# Patient Record
Sex: FEMALE | Race: BLACK OR AFRICAN AMERICAN | Employment: STUDENT | URBAN - METROPOLITAN AREA
[De-identification: names, ages, dates, MRNs, and addresses within clinical notes are randomized per-mention and may not be internally consistent; named-entity substitution may affect disease eponyms.]

---

## 2022-08-10 ENCOUNTER — HOSPITAL ENCOUNTER (OUTPATIENT)
Age: 22
Discharge: HOME | End: 2022-08-10
Payer: MEDICAID

## 2022-08-10 DIAGNOSIS — Z20.822 ENCOUNTER FOR LABORATORY TESTING FOR COVID-19 VIRUS: ICD-10-CM

## 2022-08-10 DIAGNOSIS — R30.0 DYSURIA: ICD-10-CM

## 2022-08-10 DIAGNOSIS — Z11.3 SCREENING FOR STD (SEXUALLY TRANSMITTED DISEASE): ICD-10-CM

## 2022-08-10 DIAGNOSIS — N39.0 ACUTE UTI: Primary | ICD-10-CM

## 2022-08-10 LAB
B-HCG UR QL: NEGATIVE
BILIRUB UR QL STRIP: NEGATIVE
CLARITY UR: CLEAR
COLOR UR: YELLOW
GLUCOSE UR STRIP-MCNC: NEGATIVE MG/DL
KETONES UR STRIP-MCNC: NEGATIVE MG/DL
NITRITE UR QL STRIP: POSITIVE
PH UR STRIP: 5.5 [PH]
PROT UR STRIP-MCNC: 100 MG/DL
SARS-COV-2 RNA RESP QL NAA+PROBE: NOT DETECTED
SP GR UR STRIP: >=1.03
UROBILINOGEN UR STRIP-ACNC: <2 MG/DL

## 2022-08-10 PROCEDURE — 81025 URINE PREGNANCY TEST: CPT | Performed by: NURSE PRACTITIONER

## 2022-08-10 PROCEDURE — U0002 COVID-19 LAB TEST NON-CDC: HCPCS | Performed by: NURSE PRACTITIONER

## 2022-08-10 PROCEDURE — 99204 OFFICE O/P NEW MOD 45 MIN: CPT | Performed by: NURSE PRACTITIONER

## 2022-08-10 PROCEDURE — 81002 URINALYSIS NONAUTO W/O SCOPE: CPT | Performed by: NURSE PRACTITIONER

## 2022-08-10 RX ORDER — NITROFURANTOIN 25; 75 MG/1; MG/1
100 CAPSULE ORAL 2 TIMES DAILY
Qty: 10 CAPSULE | Refills: 0 | Status: SHIPPED | OUTPATIENT
Start: 2022-08-10 | End: 2022-08-15

## 2022-08-10 NOTE — ED INITIAL ASSESSMENT (HPI)
Pt here with complaints of chills , headache and vaginal irritation that began 2 days ago , pt denies any vaginal discharge , abd pain or fever

## 2022-08-10 NOTE — ED PROVIDER NOTES
Patient Seen in: [facility]      History   Patient presents with: Body ache and/or chills  Eval-G      Subjective:   HPI  Patient is a 64-year-old female with unremarkable past medical history. She presents to the immediate care center today with primary concern for dysuria. This began 2 days ago after having sexual intercourse. She states since that same time she has had a foul vaginal odor of which she is unfamiliar. She denies urinary frequency or hematuria; denies abdominal or back pain. She does note that she had a low-grade fever and some myalgia several days preceding these other symptoms that has resolved. She states, \"I think I had a cold but it is gone. \"        Objective:   History reviewed. No pertinent past medical history. No pertinent past surgical history. No pertinent social history. Review of Systems   Constitutional: Negative. Gastrointestinal: Negative for abdominal pain, diarrhea, nausea and vomiting. Genitourinary: Positive for dysuria. Negative for flank pain, frequency and hematuria. Skin: Negative for rash. Neurological: Negative for weakness. Positive for stated complaint: chills,abdominal pain  Other systems are as noted in HPI. Constitutional and vital signs reviewed. All other systems reviewed and negative except as noted above. Physical Exam     ED Triage Vitals   BP    Pulse    Resp    Temp    Temp src    SpO2    O2 Device        Current:LMP 07/16/2022         Physical Exam  Vitals and nursing note reviewed. Constitutional:       General: She is not in acute distress. Appearance: She is not ill-appearing or toxic-appearing. Pulmonary:      Effort: Pulmonary effort is normal. No respiratory distress. Abdominal:      Tenderness: There is no abdominal tenderness. Skin:     General: Skin is warm and dry. Findings: No rash.    Neurological:      Mental Status: She is alert and oriented to person, place, and time. Psychiatric:         Behavior: Behavior normal.               ED Course     Labs Reviewed   [facility] POCT URINALYSIS DIPSTICK - Abnormal; Notable for the following components:       Result Value    Protein urine 100  (*)     Blood, Urine Small (*)     Nitrite Urine Positive (*)     Leukocyte esterase urine Small (*)     All other components within normal limits   POCT PREGNANCY URINE - Normal   RAPID SARS-COV-2 BY PCR - Normal   URINE CULTURE, ROUTINE   CHLAMYDIA/GONOCOCCUS, GREG   CHLAMYDIA/GONOCOCCUS, GREG                   MDM        Patient was advised given her urinary symptoms and the presence of leukocytes and nitrites in her urine, we will treat her today for urinary tract infection. She requested screening for sexually transmitted infection. Patient has never had a pelvic examination. The process for completing this examination was explained to patient. She was also informed that by doing so we will be able to test her today for bacterial vaginosis which is possible given her described foul odor present after intercourse. She requested screening for sexually transmitted infection by urinalysis. She was informed that this testing methodology is less sensitive and that not all necessary pelvic cultures completed by urine speciman. She states understanding, asks that we at least initiate gonorrhea and Chlamydia testing (understanding it is less sensitive) by urinalysis today, and that she will follow with gynecology for complete pelvic examination.                              Disposition and Plan     Clinical Impression:  Acute UTI  (primary encounter diagnosis)  Encounter for laboratory testing for COVID-19 virus  Dysuria  Screening for STD (sexually transmitted disease)     Disposition:  Discharge  8/10/2022  9:59 am    Follow-up:  Torres Goode MD  [address] [place] [address]  [phone]    Schedule an appointment as soon as possible for a visit in 1 week            Medications Prescribed:  Discharge Medication List as of 8/10/2022 10:00 AM    START taking these medications    nitrofurantoin monohydrate macro 100 MG Oral Cap  Take 1 capsule (100 mg total) by mouth 2 (two) times daily for 5 days. , Normal, Disp-10 capsule, R-0

## 2022-08-11 LAB
C TRACH DNA SPEC QL NAA+PROBE: NEGATIVE
N GONORRHOEA DNA SPEC QL NAA+PROBE: NEGATIVE

## 2022-08-12 RX ORDER — CEPHALEXIN 500 MG/1
500 CAPSULE ORAL 2 TIMES DAILY
Qty: 10 CAPSULE | Refills: 0 | Status: SHIPPED | OUTPATIENT
Start: 2022-08-12 | End: 2022-08-17

## 2022-08-12 RX ORDER — PHENAZOPYRIDINE HYDROCHLORIDE 200 MG/1
200 TABLET, FILM COATED ORAL 3 TIMES DAILY PRN
Qty: 6 TABLET | Refills: 0 | Status: SHIPPED | OUTPATIENT
Start: 2022-08-12 | End: 2022-08-14

## 2022-08-12 NOTE — ED QUICK NOTES
Patient called enquiring about Keflex order. RN did not have an opportunity to call patient with result and new treatment. Discussed findings with patient and use of new medication  Patients states continues to have some pain with urination. RX for pyridium sent to pharmacy.

## 2022-08-21 ENCOUNTER — HOSPITAL ENCOUNTER (OUTPATIENT)
Age: 22
Discharge: HOME | End: 2022-08-21
Payer: MEDICAID

## 2022-08-21 VITALS
RESPIRATION RATE: 19 BRPM | SYSTOLIC BLOOD PRESSURE: 122 MMHG | DIASTOLIC BLOOD PRESSURE: 70 MMHG | HEART RATE: 106 BPM | TEMPERATURE: 98 F | OXYGEN SATURATION: 100 %

## 2022-08-21 DIAGNOSIS — B00.9 HSV-2 (HERPES SIMPLEX VIRUS 2) INFECTION: Primary | ICD-10-CM

## 2022-08-21 DIAGNOSIS — N89.8 VAGINAL LESION: ICD-10-CM

## 2022-08-21 LAB
B-HCG UR QL: NEGATIVE
BILIRUB UR QL STRIP: NEGATIVE
GLUCOSE UR STRIP-MCNC: NEGATIVE MG/DL
KETONES UR STRIP-MCNC: NEGATIVE MG/DL
NITRITE UR QL STRIP: NEGATIVE
PH UR STRIP: 7.5 [PH]
PROT UR STRIP-MCNC: 100 MG/DL
SP GR UR STRIP: 1.02
UROBILINOGEN UR STRIP-ACNC: <2 MG/DL

## 2022-08-21 PROCEDURE — 81002 URINALYSIS NONAUTO W/O SCOPE: CPT | Performed by: NURSE PRACTITIONER

## 2022-08-21 PROCEDURE — 81025 URINE PREGNANCY TEST: CPT | Performed by: NURSE PRACTITIONER

## 2022-08-21 PROCEDURE — 99213 OFFICE O/P EST LOW 20 MIN: CPT | Performed by: NURSE PRACTITIONER

## 2022-08-21 RX ORDER — VALACYCLOVIR HYDROCHLORIDE 1 G/1
1000 TABLET, FILM COATED ORAL EVERY 12 HOURS
Qty: 20 TABLET | Refills: 0 | Status: SHIPPED | OUTPATIENT
Start: 2022-08-21 | End: 2022-08-31

## 2022-08-21 NOTE — ED INITIAL ASSESSMENT (HPI)
Pt presents with burning and irritation to vaginal area. \"Bumps to vaginal area\", per pt. Pt reports initially having vaginal discharge, minimal at this time. Pt was prescribed Keflex on 8/12/22, completed on 8/17/22.

## 2022-08-23 LAB
HSV1 DNA SPEC QL NAA+PROBE: NEGATIVE
HSV2 DNA SPEC QL NAA+PROBE: POSITIVE

## 2022-10-25 ENCOUNTER — HOSPITAL ENCOUNTER (OUTPATIENT)
Age: 22
Discharge: HOME | End: 2022-10-25
Payer: MEDICAID

## 2022-10-25 VITALS
DIASTOLIC BLOOD PRESSURE: 82 MMHG | OXYGEN SATURATION: 100 % | SYSTOLIC BLOOD PRESSURE: 124 MMHG | TEMPERATURE: 98 F | HEART RATE: 90 BPM | RESPIRATION RATE: 16 BRPM

## 2022-10-25 DIAGNOSIS — R82.90 FOUL SMELLING URINE: Primary | ICD-10-CM

## 2022-10-25 LAB
BILIRUB UR QL STRIP: NEGATIVE
CLARITY UR: CLEAR
COLOR UR: YELLOW
GLUCOSE UR STRIP-MCNC: NEGATIVE MG/DL
KETONES UR STRIP-MCNC: NEGATIVE MG/DL
LEUKOCYTE ESTERASE UR QL STRIP: NEGATIVE
NITRITE UR QL STRIP: NEGATIVE
PH UR STRIP: 6 [PH]
PROT UR STRIP-MCNC: NEGATIVE MG/DL
SP GR UR STRIP: 1.01
UROBILINOGEN UR STRIP-ACNC: <2 MG/DL

## 2022-10-25 PROCEDURE — 81002 URINALYSIS NONAUTO W/O SCOPE: CPT | Performed by: NURSE PRACTITIONER

## 2022-10-25 PROCEDURE — 99212 OFFICE O/P EST SF 10 MIN: CPT | Performed by: NURSE PRACTITIONER

## 2022-10-25 NOTE — ED INITIAL ASSESSMENT (HPI)
Pt here with complaints of a fishy vaginal odor for the past 3 days, pt states she has some white vaginal discharge, pt denies any sexual intercourse or abd pain

## 2023-04-09 ENCOUNTER — HOSPITAL ENCOUNTER (OUTPATIENT)
Age: 23
Discharge: ANOTHER HOSPITAL | End: 2023-04-09
Payer: MEDICAID

## 2023-04-09 VITALS
RESPIRATION RATE: 16 BRPM | OXYGEN SATURATION: 100 % | TEMPERATURE: 99 F | DIASTOLIC BLOOD PRESSURE: 71 MMHG | SYSTOLIC BLOOD PRESSURE: 90 MMHG | HEART RATE: 65 BPM

## 2023-04-09 DIAGNOSIS — Z98.890 S/P D&C (STATUS POST DILATION AND CURETTAGE): ICD-10-CM

## 2023-04-09 DIAGNOSIS — R82.90 FOUL SMELLING URINE: Primary | ICD-10-CM

## 2023-04-09 LAB
B-HCG UR QL: POSITIVE
BILIRUB UR QL STRIP: NEGATIVE
CLARITY UR: CLEAR
COLOR UR: YELLOW
GLUCOSE UR STRIP-MCNC: NEGATIVE MG/DL
KETONES UR STRIP-MCNC: NEGATIVE MG/DL
LEUKOCYTE ESTERASE UR QL STRIP: NEGATIVE
NITRITE UR QL STRIP: NEGATIVE
PH UR STRIP: 6.5 [PH]
PROT UR STRIP-MCNC: 30 MG/DL
SP GR UR STRIP: >=1.03
UROBILINOGEN UR STRIP-ACNC: <2 MG/DL

## 2024-11-11 ENCOUNTER — HOSPITAL ENCOUNTER (OUTPATIENT)
Age: 24
Discharge: HOME | End: 2024-11-11
Payer: MEDICAID

## 2024-11-11 VITALS
OXYGEN SATURATION: 100 % | TEMPERATURE: 99 F | HEART RATE: 75 BPM | RESPIRATION RATE: 20 BRPM | SYSTOLIC BLOOD PRESSURE: 91 MMHG | DIASTOLIC BLOOD PRESSURE: 73 MMHG

## 2024-11-11 DIAGNOSIS — N76.0 ACUTE VAGINITIS: Primary | ICD-10-CM

## 2024-11-11 DIAGNOSIS — Z11.3 SCREENING EXAMINATION FOR STD (SEXUALLY TRANSMITTED DISEASE): ICD-10-CM

## 2024-11-11 LAB — B-HCG UR QL: NEGATIVE

## 2024-11-11 PROCEDURE — 99213 OFFICE O/P EST LOW 20 MIN: CPT | Performed by: PHYSICIAN ASSISTANT

## 2024-11-11 PROCEDURE — 81025 URINE PREGNANCY TEST: CPT | Performed by: PHYSICIAN ASSISTANT

## 2024-11-11 NOTE — ED PROVIDER NOTES
Patient Seen in: [facility]      History     Chief Complaint   Patient presents with    Eval-G     Stated Complaint: Eval-G    Subjective:   HPI      Patient is a 24-year-old female, presenting to immediate care for evaluation of malodorous vaginal discharge for approximately 1 week after onset of menses on 11/5/2024.  States her \"pH is off\".  Reports similar episode in the past when had bacterial vaginosis.  Unsure if current BV infection.  She denies any fevers or systemic symptoms.  No nausea or vomiting.  No back, flank, abdominal, pelvic pain.  No UTI symptoms.  No vaginal bleeding.  Not currently sexually active.  Would like STD screening.  Not immunocompromised    Objective:     History reviewed. No pertinent past medical history.           History reviewed. No pertinent surgical history.             Social History     Socioeconomic History    Marital status: Single   Tobacco Use    Smoking status: Never    Smokeless tobacco: Never     Social Drivers of Health     Financial Resource Strain: Low Risk  (4/4/2024)    Received from [facility] and [facility]    Overall Financial Resource Strain (CARDIA)     Difficulty of Paying Living Expenses: Not hard at all   Food Insecurity: No Food Insecurity (4/4/2024)    Received from [facility]    Hunger Vital Sign     Worried About Running Out of Food in the Last Year: Never true     Ran Out of Food in the Last Year: Never true   Transportation Needs: No Transportation Needs (4/4/2024)    Received from [facility]    PRAPARE - Transportation     Lack of Transportation (Medical): No     Lack of Transportation (Non-Medical): No   Physical Activity: Sufficiently Active (4/4/2024)    Received from [facility] and [facility]    Exercise Vital Sign     Days of Exercise per Week: 7 days     Minutes of Exercise per Session: 30 min   Stress: Patient  Declined (4/4/2024)    Received from [facility] and [facility]    [employer] - Occupational Stress Questionnaire     Feeling of Stress : Patient declined   Social Connections: Patient Declined (4/4/2024)    Received from [facility] and [facility]    Social Connection and Isolation Panel [NHANES]     Frequency of Communication with Friends and Family: Patient declined     Frequency of Social Gatherings with Friends and Family: Patient declined     Attends Shinto Services: Patient declined     Active Member of Clubs or Organizations: Patient declined     Attends Club or Organization Meetings: Patient declined     Marital Status: Patient declined   Housing Stability: Low Risk  (4/4/2024)    Received from [facility] and [facility]    Housing Stability Vital Sign     Unable to Pay for Housing in the Last Year: No     Number of Places Lived in the Last Year: 1     Unstable Housing in the Last Year: No              Review of Systems   Constitutional:  Negative for chills and fever.   Gastrointestinal:  Negative for abdominal pain, nausea and vomiting.   Genitourinary:  Positive for vaginal discharge. Negative for decreased urine volume, difficulty urinating, dysuria, flank pain, genital sores, hematuria, pelvic pain and urgency.   Musculoskeletal:  Negative for back pain.   Psychiatric/Behavioral:  Negative for confusion.    All other systems reviewed and are negative.      Positive for stated complaint: Eval-G  Other systems are as noted in HPI.  Constitutional and vital signs reviewed.      All other systems reviewed and negative except as noted above.    Physical Exam     ED Triage Vitals [11/11/24 1505]   BP 91/73   Pulse 75   Resp 20   Temp 98.6 °F (37 °C)   Temp src Temporal   SpO2 100 %   O2 Device None (Room air)       Current Vitals:   Vital Signs  BP: 91/73  Pulse: 75  Resp: 20  Temp: 98.6 °F (37 °C)  Temp src:  Temporal    Oxygen Therapy  SpO2: 100 %  O2 Device: None (Room air)        Physical Exam  Vitals and nursing note reviewed.   Constitutional:       General: She is not in acute distress.     Appearance: Normal appearance. She is not ill-appearing, toxic-appearing or diaphoretic.   HENT:      Head: Normocephalic and atraumatic.      Mouth/Throat:      Mouth: Mucous membranes are moist.   Eyes:      General: No scleral icterus.     Conjunctiva/sclera: Conjunctivae normal.   Cardiovascular:      Rate and Rhythm: Normal rate.   Pulmonary:      Effort: No respiratory distress.   Abdominal:      General: There is no distension.      Palpations: Abdomen is soft.      Tenderness: There is no abdominal tenderness. There is no right CVA tenderness or left CVA tenderness.   Genitourinary:     General: Normal vulva.      Vagina: No vaginal discharge.      Comments: Chaperone @ [facility] RN bedside during pelvic exam. Normal external genitalia.  No ulcerations or lesions.  Normal Vaginal discharge. Scant blood in vaginal vault.  No ulcerations or lesions.  No CMT or adnexal tenderness    Musculoskeletal:         General: No deformity. Normal range of motion.   Neurological:      General: No focal deficit present.      Mental Status: She is alert.      Coordination: Coordination normal.   Psychiatric:         Mood and Affect: Mood normal.         Behavior: Behavior normal.           ED Course     Labs Reviewed   POCT PREGNANCY URINE - Normal   VAGINITIS VAGINOSIS PCR PANEL   CHLAMYDIA/GONOCOCCUS, GREG     Results for orders placed or performed during the hospital encounter of 11/11/24   POCT Pregnancy, Urine    Collection Time: 11/11/24  3:33 PM   Result Value Ref Range    POCT Urine Pregnancy Negative Negative     Urine pregnancy negative       MDM     Patient is a 24-year-old female, presenting to immediate care for evaluation of malodorous vaginal discharge for approximately 1 week after onset of menses.  Concern for possible  bacterial vaginosis.  Patient is well-appearing.  Has benign abdomen and pelvic exam.  Scant blood in vaginal vault.  No ulcerations or lesions.  No discernible vaginal discharge.  Will screen for vaginitis screening including trichomonas, bacterial vaginosis, Candida.  In addition we will screen for STIs including gonorrhea and chlamydia via vaginal swabs.  Formal results pending.  Treat if positive.  Discharge instructions on vaginitis.      Medical Decision Making      Disposition and Plan     Clinical Impression:  1. Acute vaginitis    2. Screening examination for STD (sexually transmitted disease)         Disposition:  Discharge  11/11/2024  3:39 pm    Follow-up:  No follow-up provider specified.        Medications Prescribed:  There are no discharge medications for this patient.          Supplementary Documentation:

## 2024-11-11 NOTE — ED INITIAL ASSESSMENT (HPI)
Pt here stating  \" my pH is off\" , pt states her LMP was 11/5/24 and ever since off she has had a strong vaginal odor, pt denies any abd pain urinary complaints or fevers

## 2024-11-12 LAB
BV BACTERIA DNA VAG QL NAA+PROBE: POSITIVE
C GLABRATA DNA VAG QL NAA+PROBE: NEGATIVE
C KRUSEI DNA VAG QL NAA+PROBE: NEGATIVE
C TRACH DNA SPEC QL NAA+PROBE: NEGATIVE
CANDIDA DNA VAG QL NAA+PROBE: POSITIVE
N GONORRHOEA DNA SPEC QL NAA+PROBE: NEGATIVE
T VAGINALIS DNA VAG QL NAA+PROBE: NEGATIVE

## 2024-11-12 RX ORDER — METRONIDAZOLE 500 MG/1
500 TABLET ORAL 2 TIMES DAILY
Qty: 14 TABLET | Refills: 0 | Status: SHIPPED | OUTPATIENT
Start: 2024-11-12 | End: 2024-11-19

## 2024-11-12 RX ORDER — FLUCONAZOLE 150 MG/1
150 TABLET ORAL
Qty: 2 TABLET | Refills: 0 | Status: SHIPPED | OUTPATIENT
Start: 2024-11-12

## 2025-02-21 ENCOUNTER — HOSPITAL ENCOUNTER (OUTPATIENT)
Age: 25
Discharge: HOME | End: 2025-02-21
Payer: MEDICAID

## 2025-02-21 VITALS
HEART RATE: 95 BPM | TEMPERATURE: 99 F | SYSTOLIC BLOOD PRESSURE: 119 MMHG | OXYGEN SATURATION: 100 % | RESPIRATION RATE: 20 BRPM | DIASTOLIC BLOOD PRESSURE: 77 MMHG

## 2025-02-21 DIAGNOSIS — R30.0 DYSURIA: ICD-10-CM

## 2025-02-21 DIAGNOSIS — Z11.3 SCREENING EXAMINATION FOR STD (SEXUALLY TRANSMITTED DISEASE): ICD-10-CM

## 2025-02-21 DIAGNOSIS — N76.0 ACUTE VAGINITIS: Primary | ICD-10-CM

## 2025-02-21 LAB
B-HCG UR QL: NEGATIVE
BILIRUB UR QL STRIP: NEGATIVE
CLARITY UR: CLEAR
COLOR UR: YELLOW
GLUCOSE UR STRIP-MCNC: NEGATIVE MG/DL
HGB UR QL STRIP: NEGATIVE
KETONES UR STRIP-MCNC: NEGATIVE MG/DL
LEUKOCYTE ESTERASE UR QL STRIP: NEGATIVE
NITRITE UR QL STRIP: NEGATIVE
PH UR STRIP: 5.5 [PH]
PROT UR STRIP-MCNC: NEGATIVE MG/DL
SP GR UR STRIP: >=1.03
UROBILINOGEN UR STRIP-ACNC: <2 MG/DL

## 2025-02-21 PROCEDURE — 99214 OFFICE O/P EST MOD 30 MIN: CPT | Performed by: PHYSICIAN ASSISTANT

## 2025-02-21 PROCEDURE — 81002 URINALYSIS NONAUTO W/O SCOPE: CPT | Performed by: PHYSICIAN ASSISTANT

## 2025-02-21 PROCEDURE — 81025 URINE PREGNANCY TEST: CPT | Performed by: PHYSICIAN ASSISTANT

## 2025-02-21 RX ORDER — FLUCONAZOLE 150 MG/1
150 TABLET ORAL
Qty: 2 TABLET | Refills: 0 | Status: SHIPPED | OUTPATIENT
Start: 2025-02-21

## 2025-02-21 RX ORDER — METRONIDAZOLE 500 MG/1
500 TABLET ORAL 2 TIMES DAILY
Qty: 14 TABLET | Refills: 0 | Status: SHIPPED | OUTPATIENT
Start: 2025-02-21 | End: 2025-02-28

## 2025-02-21 NOTE — ED INITIAL ASSESSMENT (HPI)
Pt here with complaints of pressure with urination , vaginal odor and discharge that began 2 days ago , pt denies any abd pain or fevers

## 2025-02-21 NOTE — ED PROVIDER NOTES
Patient Seen in: [facility]      History     Chief Complaint   Patient presents with    Urinary Symptoms    Eval-G     Stated Complaint: UTI    Subjective:   HPI      Patient is a 24-year-old female, presenting to immediate care for evaluation of gynecological concerns.  She is symptomatic.  Onset: 2 days.  She has been experiencing pain/pressure with urination with malodorous urine.  Unsure if possible UTI.  In addition endorsing malodorous clear vaginal discharge.  She denies any fevers.  No nausea or vomiting.  No back or flank or abdominal pain.  No urgency, frequency, hematuria, vaginal bleeding.  Is sexually active.  History of BV and yeast. immunocompromise.    Objective:     History reviewed. No pertinent past medical history.           History reviewed. No pertinent surgical history.             Social History     Socioeconomic History    Marital status: Single   Tobacco Use    Smoking status: Never    Smokeless tobacco: Never   Substance and Sexual Activity    Alcohol use: Never    Drug use: Never     Social Drivers of Health     Food Insecurity: No Food Insecurity (4/4/2024)    Received from [facility] and [facility]    Hunger Vital Sign     Worried About Running Out of Food in the Last Year: Never true     Ran Out of Food in the Last Year: Never true   Transportation Needs: No Transportation Needs (4/4/2024)    Received from [facility] and [facility]    PRAPARE - Transportation     Lack of Transportation (Medical): No     Lack of Transportation (Non-Medical): No   Housing Stability: Low Risk  (4/4/2024)    Received from [facility] and [facility]    Housing Stability Vital Sign     Unable to Pay for Housing in the Last Year: No     Number of Places Lived in the Last Year: 1     Unstable Housing in the Last Year: No              Review of Systems   Constitutional:  Negative for chills and fever.   Gastrointestinal:  Negative  for abdominal pain, nausea and vomiting.   Genitourinary:  Positive for dysuria and vaginal discharge. Negative for difficulty urinating, flank pain, hematuria, pelvic pain, urgency, vaginal bleeding and vaginal pain.   Musculoskeletal:  Negative for back pain.   Allergic/Immunologic: Negative for immunocompromised state.   Psychiatric/Behavioral:  Negative for confusion.        Positive for stated complaint: UTI  Other systems are as noted in HPI.  Constitutional and vital signs reviewed.      All other systems reviewed and negative except as noted above.    Physical Exam     ED Triage Vitals [02/21/25 1115]   /77   Pulse 95   Resp 20   Temp 98.6 °F (37 °C)   Temp src Oral   SpO2 100 %   O2 Device None (Room air)       Current Vitals:   Vital Signs  BP: 119/77  Pulse: 95  Resp: 20  Temp: 98.6 °F (37 °C)  Temp src: Oral    Oxygen Therapy  SpO2: 100 %  O2 Device: None (Room air)        Physical Exam  Vitals and nursing note reviewed.   Constitutional:       General: She is not in acute distress.     Appearance: Normal appearance. She is not ill-appearing, toxic-appearing or diaphoretic.   HENT:      Head: Normocephalic and atraumatic.      Mouth/Throat:      Mouth: Mucous membranes are moist.   Eyes:      General: No scleral icterus.     Conjunctiva/sclera: Conjunctivae normal.   Pulmonary:      Effort: No respiratory distress.   Abdominal:      General: There is no distension.      Palpations: Abdomen is soft.      Tenderness: There is no abdominal tenderness. There is no right CVA tenderness, left CVA tenderness or guarding.   Genitourinary:     Vagina: Vaginal discharge present.      Comments: Chaperone [facility] RN @ bedside during pelvic exam. Normal external genitalia.  No ulcerations or lesions.  + Vaginal discharge. Gray milky vaginal discharge. + clumps white discharge consistent with candida. No blood in vaginal vault.  No ulcerations or lesions.  No CMT or adnexal tenderness    Musculoskeletal:          General: No deformity. Normal range of motion.   Neurological:      General: No focal deficit present.      Mental Status: She is alert and oriented to person, place, and time.      Motor: No weakness.      Coordination: Coordination normal.      Gait: Gait normal.   Psychiatric:         Mood and Affect: Mood normal.         Behavior: Behavior normal.           ED Course     Labs Reviewed   POCT PREGNANCY URINE - Normal   [facility] POCT URINALYSIS DIPSTICK   URINE CULTURE, ROUTINE   CHLAMYDIA/GONOCOCCUS, GREG   VAGINITIS VAGINOSIS PCR PANEL     Results for orders placed or performed during the hospital encounter of 02/21/25   POCT Urinalysis Dipstick    Collection Time: 02/21/25 11:19 AM   Result Value Ref Range    Urine Color Yellow Yellow    Urine Clarity Clear Clear    Specific Gravity, Urine >=1.030 1.005 - 1.030    PH, Urine 5.5 5.0 - 8.0    Protein urine Negative Negative mg/dL    Glucose, Urine Negative Negative mg/dL    Ketone, Urine Negative Negative mg/dL    Bilirubin, Urine Negative Negative    Blood, Urine Negative Negative    Nitrite Urine Negative Negative    Urobilinogen urine <2.0 <2.0 mg/dL    Leukocyte esterase urine Negative Negative   POCT Pregnancy, Urine    Collection Time: 02/21/25 11:25 AM   Result Value Ref Range    POCT Urine Pregnancy Negative Negative          MDM       Differential diagnoses considered included, but are not exclusive of: Urinary tract infection, cystitis, overactive bladder syndrome, vaginitis, vaginal yeast infection, bacterial vaginosis, urethritis, STI, pyelonephritis, nephrolithiasis, etc      Patient is a 24-year-old female, presenting to immediate care for evaluation of UTI symptoms (dysuria and malodorous urine) and malodorous watery vaginal discharge for the last 2 days.  No fevers no systemic symptoms.  Benign abdominal exam.  Pelvic exam with vaginal discharge consistent with bacterial vaginosis.  Will treat for bacterial vaginosis with oral Diflucan 7-day course  vaginitis panel sent/pending.  Cheeselike discharge consistent with Candida.  Will prescribe oral Diflucan empiric treatment.  In addition STI screening including gonorrhea, chlamydia, trichomonas vaginal swab sent/pending.  Urine pregnancy is negative.  Urine dip unremarkable.  Urine is clear.  Urine negative for UTI-negative nitrates and leukocyte esterase.  Urine negative for protein, blood, ketones, glucose, etc.  Patient with urinary dysuria and malodorous urine we will send for urine culture.  Further follow-up with PCP and gynecology.        Medical Decision Making      Disposition and Plan     Clinical Impression:  1. Acute vaginitis    2. Dysuria    3. Screening examination for STD (sexually transmitted disease)         Disposition:  Discharge  2/21/2025 12:05 pm    Follow-up:  Rochelle Rogers MD  [address] [place] [id number] [phone]    Schedule an appointment as soon as possible for a visit   Gynecologist    Charlene Manriquez MD  [address] [place], [place] [id number] [phone]      Primary Doctor, If needed          Medications Prescribed:  Current Discharge Medication List        START taking these medications    Details   !! fluconazole (DIFLUCAN) 150 MG Oral Tab Take 1 tablet (150 mg total) by mouth every third day as needed.  Qty: 2 tablet, Refills: 0      metroNIDAZOLE 500 MG Oral Tab Take 1 tablet (500 mg total) by mouth in the morning and 1 tablet (500 mg total) before bedtime. Do all this for 7 days.  Qty: 14 tablet, Refills: 0       !! - Potential duplicate medications found. Please discuss with provider.              Supplementary Documentation:

## 2025-02-22 LAB
BV BACTERIA DNA VAG QL NAA+PROBE: POSITIVE
C GLABRATA DNA VAG QL NAA+PROBE: NEGATIVE
C KRUSEI DNA VAG QL NAA+PROBE: NEGATIVE
CANDIDA DNA VAG QL NAA+PROBE: NEGATIVE
T VAGINALIS DNA VAG QL NAA+PROBE: NEGATIVE

## 2025-02-23 RX ORDER — NITROFURANTOIN 25; 75 MG/1; MG/1
100 CAPSULE ORAL 2 TIMES DAILY
Qty: 10 CAPSULE | Refills: 0 | Status: SHIPPED | OUTPATIENT
Start: 2025-02-23 | End: 2025-02-28

## 2025-02-24 LAB
C TRACH DNA SPEC QL NAA+PROBE: NEGATIVE
N GONORRHOEA DNA SPEC QL NAA+PROBE: NEGATIVE